# Patient Record
Sex: FEMALE | Race: WHITE
[De-identification: names, ages, dates, MRNs, and addresses within clinical notes are randomized per-mention and may not be internally consistent; named-entity substitution may affect disease eponyms.]

---

## 2017-11-18 ENCOUNTER — HOSPITAL ENCOUNTER (EMERGENCY)
Dept: HOSPITAL 17 - NEPD | Age: 62
Discharge: HOME | End: 2017-11-18
Payer: MEDICARE

## 2017-11-18 VITALS
DIASTOLIC BLOOD PRESSURE: 90 MMHG | OXYGEN SATURATION: 96 % | RESPIRATION RATE: 17 BRPM | HEART RATE: 89 BPM | TEMPERATURE: 98.3 F | SYSTOLIC BLOOD PRESSURE: 135 MMHG

## 2017-11-18 VITALS — HEIGHT: 64 IN | WEIGHT: 154.32 LBS | BODY MASS INDEX: 26.35 KG/M2

## 2017-11-18 DIAGNOSIS — S50.02XA: ICD-10-CM

## 2017-11-18 DIAGNOSIS — S93.401A: ICD-10-CM

## 2017-11-18 DIAGNOSIS — S40.011A: ICD-10-CM

## 2017-11-18 DIAGNOSIS — S80.02XA: ICD-10-CM

## 2017-11-18 DIAGNOSIS — S50.01XA: ICD-10-CM

## 2017-11-18 DIAGNOSIS — S60.212A: Primary | ICD-10-CM
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
## 2017-11-18 PROCEDURE — 73080 X-RAY EXAM OF ELBOW: CPT

## 2017-11-18 PROCEDURE — 73610 X-RAY EXAM OF ANKLE: CPT

## 2017-11-18 PROCEDURE — 99284 EMERGENCY DEPT VISIT MOD MDM: CPT

## 2017-11-18 PROCEDURE — 73110 X-RAY EXAM OF WRIST: CPT

## 2017-11-18 PROCEDURE — 73564 X-RAY EXAM KNEE 4 OR MORE: CPT

## 2017-11-18 PROCEDURE — 73030 X-RAY EXAM OF SHOULDER: CPT

## 2017-11-18 PROCEDURE — L1906 AFO MULTILIG ANK SUP PRE OTS: HCPCS

## 2017-11-18 NOTE — RADRPT
EXAM DATE/TIME:  11/18/2017 15:25 

 

[facility] COMPARISON:     

No previous studies available for comparison.

 

                     

INDICATIONS :     

Pain post fall.

                     

 

MEDICAL HISTORY :     

None.          

 

SURGICAL HISTORY :     

None.   

 

ENCOUNTER:     

Initial                                        

 

ACUITY:     

3 days      

 

PAIN SCORE:     

4/10

 

LOCATION:     

Left  Entire knee.

 

FINDINGS:     

Lateral view shows mild prepatellar soft tissue swelling. No fracture or subluxation. No significant 
joint effusion demonstrated.

 

CONCLUSION:     

Intact left knee. Mild prepatellar soft tissue swelling.

 

 

 

 Tommy Thomas MD on November 18, 2017 at 16:11           

Board Certified Radiologist.

 This report was verified electronically.

## 2017-11-18 NOTE — RADRPT
EXAM DATE/TIME:  11/18/2017 15:42 

 

[facility] COMPARISON:     

No previous studies available for comparison.

 

                     

INDICATIONS :     

Pain post fall.

                     

 

MEDICAL HISTORY :     

None.          

 

SURGICAL HISTORY :     

None.   

 

ENCOUNTER:     

Initial                                        

 

ACUITY:     

3 days      

 

PAIN SCORE:     

3/10

 

LOCATION:     

Right  Posterior side of elbow.

 

FINDINGS:     

Multiple view examination of the right elbow demonstrates no soft tissue swelling, joint effusion, or
 fracture.  The osseous structures are in normal alignment.  Bony mineralization is normal.

 

CONCLUSION:     

Normal right elbow radiographs..

 

 

 

 

 Tommy Thomas MD on November 18, 2017 at 16:17           

Board Certified Radiologist.

 This report was verified electronically.

## 2017-11-18 NOTE — RADRPT
EXAM DATE/TIME:  11/18/2017 15:20 

 

[facility] COMPARISON:     

No previous studies available for comparison.

 

                     

INDICATIONS :     

Pain post fall.

                     

 

MEDICAL HISTORY :     

None.          

 

SURGICAL HISTORY :     

None.   

 

ENCOUNTER:     

Initial                                        

 

ACUITY:     

3 days      

 

PAIN SCORE:     

3/10

 

LOCATION:     

Right  Shoulder blade.

 

FINDINGS:     

Bones of the right shoulder are intact and normally aligned.

 

Moderate acromioclavicular and mild glenohumeral joint osteoarthritis noted.

 

CONCLUSION:     

Intact right shoulder. Mild-to-moderate degenerative changes as above.

 

 

 

 Tommy Thomas MD on November 18, 2017 at 16:10           

Board Certified Radiologist.

 This report was verified electronically.

## 2017-11-18 NOTE — RADRPT
EXAM DATE/TIME:  11/18/2017 15:35 

 

[facility] COMPARISON:     

No previous studies available for comparison.

 

                     

INDICATIONS :     

Pain post fall.

                     

 

MEDICAL HISTORY :     

None.          

 

SURGICAL HISTORY :     

None.   

 

ENCOUNTER:     

Initial                                        

 

ACUITY:     

3 days      

 

PAIN SCORE:     

2/10

 

LOCATION:     

Left  Posterior side of elbow

 

FINDINGS:     

Multiple view examination of the left elbow demonstrates no soft tissue swelling, joint effusion, or 
fracture.  The osseous structures are in normal alignment.  Bony mineralization is normal.

 

CONCLUSION:     No acute disease.  

 

 

 

 

 Isidoro Knox MD on November 18, 2017 at 16:14           

Board Certified Radiologist.

 This report was verified electronically.

## 2017-11-18 NOTE — RADRPT
EXAM DATE/TIME:  11/18/2017 15:42 

 

[facility] COMPARISON:     

No previous studies available for comparison.

 

                     

INDICATIONS :     

Pain post fall.

                     

 

MEDICAL HISTORY :     

None.          

 

SURGICAL HISTORY :     

None.   

 

ENCOUNTER:     

Initial                                        

 

ACUITY:     

3 days      

 

PAIN SCORE:     

6/10

 

LOCATION:     

Right  Ankle.

 

FINDINGS:     

No fracture or subluxation is seen in the right ankle.

 

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 Mild osteoarthritis thinning of the tibiotalar, talonavicu
lar and calcaneocuboid joints.

 

Large heel spur.

 

Small enthesophyte of distal Achilles.

 

CONCLUSION:     

1. Intact right ankle.

2. Mild to moderate degenerative changes as above.

3. Large heel spur.

4. Small distal Achilles enthesophyte.

 

 

 

 Tommy Thomas MD on November 18, 2017 at 16:32           

Board Certified Radiologist.

 This report was verified electronically.

## 2017-11-18 NOTE — RADRPT
EXAM DATE/TIME:  11/18/2017 15:33 

 

[facility] COMPARISON:     

No previous studies available for comparison.

 

                     

INDICATIONS :     

Pain post fall.

                     

 

MEDICAL HISTORY :     

None.          

 

SURGICAL HISTORY :     

None.   

 

ENCOUNTER:     

Initial                                        

 

ACUITY:     

3 days      

 

PAIN SCORE:     

3/10

 

LOCATION:     

Left  Wrist.

 

FINDINGS:     

Three view examination of the left wrist demonstrates no soft tissue swelling, dislocation, or fractu
re.  The carpal bones are in normal alignment.  The joint spaces are maintained.  Bony mineralization
 is normal.

 

CONCLUSION:     No acute disease.  

 

 

 

 Isidoro Knox MD on November 18, 2017 at 16:14           

Board Certified Radiologist.

 This report was verified electronically.

## 2018-05-02 ENCOUNTER — HOSPITAL ENCOUNTER (OUTPATIENT)
Dept: HOSPITAL 17 - ESDC | Age: 63
Discharge: HOME | End: 2018-05-02
Attending: INTERNAL MEDICINE
Payer: MEDICARE

## 2018-05-02 DIAGNOSIS — K57.30: ICD-10-CM

## 2018-05-02 DIAGNOSIS — K52.9: ICD-10-CM

## 2018-05-02 DIAGNOSIS — K29.70: ICD-10-CM

## 2018-05-02 DIAGNOSIS — K20.9: ICD-10-CM

## 2018-05-02 DIAGNOSIS — R10.9: Primary | ICD-10-CM

## 2018-05-02 DIAGNOSIS — K64.4: ICD-10-CM

## 2018-05-02 DIAGNOSIS — K29.80: ICD-10-CM

## 2018-05-02 DIAGNOSIS — K64.8: ICD-10-CM

## 2018-05-02 DIAGNOSIS — K62.1: ICD-10-CM

## 2018-05-02 DIAGNOSIS — K21.9: ICD-10-CM

## 2018-05-02 PROCEDURE — 43239 EGD BIOPSY SINGLE/MULTIPLE: CPT

## 2018-05-02 PROCEDURE — 88305 TISSUE EXAM BY PATHOLOGIST: CPT

## 2018-05-02 PROCEDURE — 00813 ANES UPR LWR GI NDSC PX: CPT

## 2018-05-02 PROCEDURE — 45380 COLONOSCOPY AND BIOPSY: CPT

## 2018-05-02 NOTE — GIPROC
[facility]

[address], [address]

 

 

COLONOSCOPY PROCEDURE REPORT     EXAM DATE: 05/02/2018

 

PATIENT NAME:      Yen Valero           MR #:      [id number]

YOB: 1955      VISIT #:     [id number]

ENDOSCOPIST:     Elisabet Power MD     ORDER #:     [id number]

ASSISTANT:      Brie Parker RN     STATUS:     outpatient

 

INDICATIONS:  The patient is a 62 yr old female here for a colonoscopy due to

abdominal pain and chronic diarrhea

PROCEDURE PERFORMED:     Colonoscopy with biopsy

MEDICATIONS:     None and Per Anesthesia.

PREP QUALITY:     The [place] Bowel Prep Score was Right colon 3, Mid colon 2,

and Left colon 3.  Total = 8.

ESTIMATED BLOOD LOSS:     None

 

CONSENT: The patient understands the risks and benefits of the procedure and

understands that these risks include, but are not limited to: sedation,

allergic reaction, infection, perforation and/or bleeding. Alternative means of

evaluation and treatment include, among others: physical exam, x-rays, and/or

surgical intervention. The patient elects to proceed with this endoscopic

procedure.

 



medical equipment was checked for proper function. Hand hygiene and appropriate

measures for infection prevention was taken. After the risks, benefits and

alternatives of the procedure were thoroughly explained, Informed consent was

verified, confirmed and timeout was successfully executed by the treatment

team. A digital exam revealed external hemorrhoids The EC-[id number] ([id number])

endoscope was introduced through the anus and advanced to the cecum, which was

identified by both the appendix and ileocecal valve. The instrument was then

slowly withdrawn as the colon was fully examined.

 

 

COLON FINDINGS: A medium sized patch of colitis was found at the ileocecal

valve.  The mucosa was congested, edematous and erythematous.  A biopsy was

performed using cold forceps.   Moderate diverticulosis was noted in the

sigmoid colon.  No bleeding was noted from the diverticulosis.   A polypoid

shaped flat polyp ranging between 3-5mm in size was found in the rectum.  A

polypectomy was performed with cold forceps.  The resection was complete and

the polyp tissue was completely retrieved. Retroflexed views revealed internal

hemorrhoids and Retroflexed views revealed medium internal hemorrhoids The

scope was then completely withdrawn from the patient and the procedure

terminated.

PROCEDURE WITHDRAWAL TIME:7minutes

 

 

 

ADVERSE EVENTS:      There were no complications.

IMPRESSIONS:     1.  Medium sized colitis was found at the ileocecal valve; The

mucosa was congested, edematous and erythematous; biopsy was performed using

cold forceps

2.  Moderate diverticulosis was noted in the sigmoid colon

3.  A flat polyp ranging between 3-5mm in size was found in the rectum;

polypectomy was performed with cold forceps

4.  Retroflexed views revealed internal hemorrhoids

5.  Retroflexed views revealed medium internal hemorrhoids

6.  Revealed external hemorrhoids

 

RECOMMENDATIONS:     1.  Await biopsy results.  Biopsy results will not be ready

for 7-10 days.  If you don't hear from us in two weeks, call our office for

results.

2.  Benefiber 2 tsp daily

3.  Continue surveillance

4.  Yearly hemoccult

5.  High fiber diet

6.  No seeds, nuts and popcorn in diet

RECALL:     Return 3 years Colonoscopy, pending biopsy results

 

_____________________________

Elisabet Power MD

eSigned:  Elisabet Power MD 05/02/2018 10:23 AM

 

 

cc:  [facility] Romaine Crain D.O.

 

 

 

 

PATIENT NAME:  Yen Valero

MR#: [id number]

## 2018-05-02 NOTE — GIPROC
[facility]

[address] [facility], [address]

 

 

EGD PROCEDURE REPORT     EXAM DATE: 05/02/2018

 

PATIENT NAME:      Yen Valero           MR #:      [id number]

YOB: 1955      VISIT #:     [id number]

ATTENDING:     Elisabet Power MD     ORDER #:     [id number]

ASSISTANT:      Brie Parker RN     STATUS:     outpatient

 

INDICATIONS:  The patient is a 62 yr old female here for an EGD due to history

of esophageal reflux

PROCEDURE PERFORMED:     EGD w/ biopsy

MEDICATIONS:     None and Per Anesthesia.

TOPICAL ANESTHETIC:

 

CONSENT: The patient understands the risks and benefits of the procedure and

understands that these risks include, but are not limited to: sedation,

allergic reaction, infection, perforation and/or bleeding. Alternative means of

evaluation and treatment include, among others: physical exam, x-rays, and/or

surgical intervention. The patient elects to proceed with this endoscopic

procedure.

 



medical equipment was checked for proper function. Hand hygiene and appropriate

measures for infection prevention was taken. After the risks, benefits and

alternatives of the procedure were thoroughly explained, Informed consent was

verified, confirmed and timeout was successfully executed by the treatment

team. The patient was anesthetized with topical anesthesia and the EC-[id number]

([id number]) endoscope was introduced through the mouth and advanced to the second

portion of the duodenum.  Retroflexed views revealed no abnormalities and

Retroflexed views revealed  The gastroscope was then slowly withdrawn and

removed.

 

ESOPHAGUS: There was LA Class A esophagitis noted.  A biopsy was performed.   A

biopsy was performed using cold forceps.  Sample sent for histology.   STOMACH:

There was erythematous moderate gastritis in the gastric antrum.  A biopsy was

performed.   DUODENUM: Mild duodenal inflammation was found in the 2nd part of

the duodenum.

 

 

 

ADVERSE EVENTS:     There were no complications.

IMPRESSIONS:     1.  There was LA Class A esophagitis noted; biopsy was

performed

2.  There was erythematous gastritis in the gastric antrum; biopsy was performed

 

3.  Duodenal inflammation was found in the 2nd part of the duodenum

4.  Retroflexed views revealed no abnormalities

5.  Retroflexed views revealed

 

RECOMMENDATIONS:     1.  Await biopsy results.  Biopsy results will not be ready

for 7-10 days.  If you don't hear from us in two weeks, call our office for

biopsy results.

2.  Anti-reflux regimen

PATIENT CONDITION:     stable

DISPOSITION:     Home

REPEAT EXAM:     Return 1 year EGD pending biopsy results

 

 

___________________________________

Elisabet Power MD

eSigned:  Eilsabet Power MD 05/02/2018 10:08 AM

 

 

cc: [facility]

 

 

 

 

PATIENT NAME:  Yen Valero

MR#: [id number]